# Patient Record
(demographics unavailable — no encounter records)

---

## 2024-10-09 NOTE — PHYSICAL EXAM
[Well Developed] : well developed [Well Nourished] : well nourished [Normal Conjunctiva] : normal conjunctiva [Normal Venous Pressure] : normal venous pressure [Normal S1, S2] : normal S1, S2 [No Rub] : no rub [Soft] : abdomen soft [Non Tender] : non-tender [Normal Gait] : normal gait [No Rash] : no rash [Moves all extremities] : moves all extremities [Alert and Oriented] : alert and oriented [No Murmur] : no murmur

## 2024-10-15 NOTE — HISTORY OF PRESENT ILLNESS
[FreeTextEntry1] : 53 y/o male with a PMHx of NICM s/p ICD 2019, LVEF 20% 2019, HTN, DM, PAF, current everyday cigarette and marijuana smoker. He is here for a follow-up. He recently  from his wife and is now living at a shelter, but has no other complaints. He denies SOB, LOC, chest pain, PND, orthopnea. He is compliant with medications and denies any side effects. Renal artery duplex (negative for TANIA on wet read) performed today. TTE done today reviewed, LVEF remains depressed at ~35%,  thickening of LV walls concerning for cardiac amyloidosis. Patient has non ischemic cardiomyopathy, LHC 2018 showed no CAD.

## 2024-10-15 NOTE — CARDIOLOGY SUMMARY
[de-identified] : TTE  5/10- LV systolic function improved, ~40% from ~20% in 2019. Diastolic function also, improved; MV inflow velocities suggest a less restrictive filling pattern, E/e' from 22 E/A 1.95 from 2.95, RV looks normal size now.

## 2024-10-15 NOTE — ASSESSMENT
[FreeTextEntry1] : 53 y/o M with h/o chronic systolic HF, NICM, HTN coming for follow up. RA duplex negative today for TANIA. TTE concerning for Cardiac amyloidosis. He is euvolemic on exam, no change in ET reported.   NICM/HFrEF/Resistant hypertension   NYHA class I/II  Euvolemic on exam POCUS IVC 1.7 cm and collapses >50% Continue Furosemide 40 mg daily, take extra dose as needed for weight gain of 2 lb in one day Continue Entresto 97/107 mg BID Continue Amlodipine 10 mg daily  Continue Carvedilol 25 mg BID Continue Farxiga 10 mg daily Continue Spironolactone 25 mg daily Blood work from April reviewed TTE and renal artery duplex performed today Will order NM cardiac amyloidosis SPECT to r/o cardiac ATTR amyloidosis, and follow up genetic test for amyloidosis Will send for serum light chains, immunofixation, pro-BNP, A1c and CMP RTO in 4 month or sooner based on above work up    The patient was counseled on lifestyle modifications to eat a heart-healthy diet, including sodium restriction and regular exercise. To keep a log of his blood pressure, heart rate, and daily weight. The weight is recommended to take first thing in the morning upon voiding. The blood pressure and heart rate can be taken anytime during the day.   Delon Santacruz MD, FACC, [facility]  Advanced Heart Failure/ Mechanical Circulatory Support Pulmonary Hypertension and Cardiac Amyloidosis  [facility]

## 2024-10-15 NOTE — ASSESSMENT
[FreeTextEntry1] : 53 y/o M with h/o chronic systolic HF, NICM, HTN coming for follow up. RA duplex negative today for TANIA. TTE concerning for Cardiac amyloidosis. He is euvolemic on exam, no change in ET reported.   NICM/HFrEF/Resistant hypertension   NYHA class I/II  Euvolemic on exam POCUS IVC 1.7 cm and collapses >50% Continue Furosemide 40 mg daily, take extra dose as needed for weight gain of 2 lb in one day Continue Entresto 97/107 mg BID Continue Amlodipine 10 mg daily  Continue Carvedilol 25 mg BID Continue Farxiga 10 mg daily Continue Spironolactone 25 mg daily Blood work from April reviewed TTE and renal artery duplex performed today Will order NM cardiac amyloidosis SPECT to r/o cardiac ATTR amyloidosis, and follow up genetic test for amyloidosis Will send for serum light chains, immunofixation, pro-BNP, A1c and CMP RTO in 4 month or sooner based on above work up    The patient was counseled on lifestyle modifications to eat a heart-healthy diet, including sodium restriction and regular exercise. To keep a log of his blood pressure, heart rate, and daily weight. The weight is recommended to take first thing in the morning upon voiding. The blood pressure and heart rate can be taken anytime during the day.   Delon Santacruz MD, FACC, [facility]  Advanced Heart Failure/ Mechanical Circulatory Support Pulmonary Hypertension and Cardiac Amyloidosis  [facility]

## 2024-10-15 NOTE — REVIEW OF SYSTEMS
[Fever] : no fever [Blurry Vision] : no blurred vision [Sore Throat] : no sore throat [SOB] : no shortness of breath [Dyspnea on exertion] : not dyspnea during exertion [Chest Discomfort] : no chest discomfort [Palpitations] : no palpitations [Cough] : no cough [Abdominal Pain] : no abdominal pain [Joint Pain] : no joint pain [Rash] : no rash [Dizziness] : no dizziness

## 2024-10-15 NOTE — CARDIOLOGY SUMMARY
[de-identified] : TTE  5/10- LV systolic function improved, ~40% from ~20% in 2019. Diastolic function also, improved; MV inflow velocities suggest a less restrictive filling pattern, E/e' from 22 E/A 1.95 from 2.95, RV looks normal size now.

## 2025-03-05 NOTE — DISCUSSION/SUMMARY
[FreeTextEntry1] : Mr. Vandana Tuttle is a pleasant 55 year-old man with severe non-ischemic cardiomyopathy, likely secondary to ETOH use, PAF on device interrogation (May 2020), chronic systolic heart failure (stage C NYHA II/III with EF < 20%, hyperlipidemia, hypertension, DM II, referred for evaluation for ICD. His EF remained low, despite compliance with GDMT (metoprolol and Lisinopril), and stopping ETOH. I am recommending a defibrillator implant for the primary prevention of sudden cardiac death. He underwent ICD implantation on 7/3/2019, h/o inappropriate shock for AF with RVR August 2023 due to medication non-compliance.  I recommend to continue same medications. He is motivated to maintain compliance with medications.  He is following with. Dr. Mays.  - Device interrogation normal. I interrogated and reprogrammed the device as described above.  - The patient is on remote and transmitting.   NSVT - Brief episodes.  - Continue Coreg 25mg BID.   # PAF - Asymptomatic. No inappropriate shocks for AF. Some AF with RVR recorded on device.  - Continue Eliquis 5mg BID. No s/s bleeding reported.  - I recommend CBC, BMP at least every 6 months. Follows with Dr. Mehta.  - Continue Coreg.   # HTN, HF - BP WNL. - Optivol now below threshold. No s/s HF on exam.  - Continue GDMT.   I interrogated and reprogrammed his device as described in procedure. His wound is healed properly, with no signs of inflammation, infection or bleeding. I discussed with patient plan of care in great details. I discussed remote monitoring with him, and need to call office if he does manual transmission. I answered all his questions to his satisfaction. Patient was pleased with the visit.    Patient will follow with me in 6 months' time. Please do not hesitate to contact me at [phone] if you have any further questions regarding this patient care.  Patient number: [phone]

## 2025-03-05 NOTE — PHYSICAL EXAM
[General Appearance - Well Developed] : well developed [Normal Appearance] : normal appearance [Well Groomed] : well groomed [General Appearance - Well Nourished] : well nourished [No Deformities] : no deformities [General Appearance - In No Acute Distress] : no acute distress [Heart Rate And Rhythm] : heart rate and rhythm were normal [Heart Sounds] : normal S1 and S2 [Respiration, Rhythm And Depth] : normal respiratory rhythm and effort [Exaggerated Use Of Accessory Muscles For Inspiration] : no accessory muscle use [Left Infraclavicular] : left infraclavicular area [Clean] : clean [Dry] : dry [Well-Healed] : well-healed [Abdomen Soft] : soft [Nail Clubbing] : no clubbing of the fingernails [Cyanosis, Localized] : no localized cyanosis [Normal Conjunctiva] : the conjunctiva exhibited no abnormalities [Eyelids - No Xanthelasma] : the eyelids demonstrated no xanthelasmas [Abnormal Walk] : normal gait [Gait - Sufficient For Exercise Testing] : the gait was sufficient for exercise testing [Skin Color & Pigmentation] : normal skin color and pigmentation [] : no rash [No Venous Stasis] : no venous stasis [Skin Lesions] : no skin lesions [No Skin Ulcers] : no skin ulcer [No Xanthoma] : no  xanthoma was observed [Oriented To Time, Place, And Person] : oriented to person, place, and time [FreeTextEntry1] : no JVD

## 2025-03-05 NOTE — HISTORY OF PRESENT ILLNESS
[Palpitations] : no palpitations [SOB] : no dyspnea [Syncope] : no syncope [Dizziness] : no dizziness [Chest Pain] : no chest pain or discomfort [ICD Shocks] : no recent ICD shocks [Shoulder Pain] : no shoulder pain [Pain at Site] : no pain at device site [Erythema at Site] : no erythema at device site [Swelling at Site] : no swelling at device site [de-identified] : Severe NICM, chronic systolic heart failure NYHA II/III EF low despite GDMT underwent ICD implant on 7/3/2019  h/o poor compliance with medical regimen; problems with [facility] per patient.  He presents for follow up. He feels well and has no cardiac complaints.  Got home from rehab and is trying to get everything back in order. He lost his MDT remote monitor when moving a couple of months ago and needs a replacement.   Referring Physician: Dr. Pretty Chávez   CARDIAC TESTING: ECG (1/24/2022): sinus rhythm at 94 bpm, q waves aVF, V1-V2, diffuse ST/T abnormalities ECG (11/24/2020): sinus rhythm at 93 bpm, q waves aVF, V1-V3, diffuse ST/T abnormalities ECG (3/3/2020): sinus rhythm at 84 bpm, q waves aVF, V1-V3, diffuse ST/T abnormalities ECG (8/14/2019): sinus rhythm at 99 bpm, q waves aVF, V1-V3, diffuse ST/T abnormalities ECG (6/24/2019): sinus rhythm at 88 bpm, q waves II aVF, V1-V4, diffuse ST/T abnormalities Echo (4/2/2019): LVEF < 20% Echo (9/23/2018): LVEF 25-30% Cardiac Cath (9/24/2018): normal coronaries

## 2025-03-05 NOTE — PROCEDURE
[No] : not [NSR] : normal sinus rhythm [ICD] : Implantable cardioverter-defibrillator [VVI] : VVI [Voltage: ___ volts] : Voltage was [unfilled] volts [Magnet Rate: ___ Ppm] : magnet rate was [unfilled] Ppm [Longevity: ___ months] : The estimated remaining battery life is [unfilled] months [Threshold Testing Performed] : Threshold testing was performed [Lead Imp:  ___ohms] : lead impedance was [unfilled] ohms [Sensing Amplitude ___mv] : sensing amplitude was [unfilled] mv [___V @] : [unfilled] V [___ ms] : [unfilled] ms [Programmed for Longevity] : output reprogrammed for improved battery longevity [Sense ___ %] : Sense [unfilled]% [de-identified] : Medtronic [de-identified] : Lorena ALDRIDGE  [de-identified] : [id number] [de-identified] : 7/3/19 [de-identified] : 40 [de-identified] : 3.9 years [de-identified] : 388 NSVT episodes, some are true brief NSVT episodes, most are AF with RVR No therapies or shocks Optivol accumulation 10/29/24-12/30/24, now below threshold.

## 2025-06-18 NOTE — CARDIOLOGY SUMMARY
[de-identified] : TTE  5/10- LV systolic function improved, ~40% from ~20% in 2019. Diastolic function also, improved; MV inflow velocities suggest a less restrictive filling pattern, E/e' from 22 E/A 1.95 from 2.95, RV looks normal size now.

## 2025-06-18 NOTE — PHYSICAL EXAM
[Well Developed] : well developed [Well Nourished] : well nourished [Normal Conjunctiva] : normal conjunctiva [Normal Venous Pressure] : normal venous pressure [Normal S1, S2] : normal S1, S2 [No Murmur] : no murmur [No Rub] : no rub [Soft] : abdomen soft [Non Tender] : non-tender [Normal Gait] : normal gait [No Rash] : no rash [Moves all extremities] : moves all extremities [Alert and Oriented] : alert and oriented

## 2025-06-18 NOTE — HISTORY OF PRESENT ILLNESS
[FreeTextEntry1] : 53 y/o male with a PMHx of NICM s/p ICD 2019, LVEF 20% 2019, HTN, DM, PAF, current everyday cigarette and marijuana smoker. He is here for a follow-up. He recently  from his wife and is now living at a shelter, but has no other complaints. He denies SOB, LOC, chest pain, PND, orthopnea. He is compliant with medications and denies any side effects. Renal artery duplex (negative for TANIA on wet read) performed today. TTE done today reviewed, LVEF remains depressed at ~35%,  thickening of LV walls concerning for cardiac amyloidosis. Patient has non ischemic cardiomyopathy, LHC 2018 showed no CAD.  06/18/2025: Patient presents for F/U visit on management of HFrEF. He reports feeling well and having no limitations with exertion. He stopped taking Eliquis, Farxiga, Spironolactone, Furosemide and Amlodipine 3 weeks ago because he ran out. Complains of rash in groin area and dark, foul-smelling urine. He doesn't check his BP at home, it is elevated in the office today, no TANIA on US, patient taking carvedilol 25 mg BID and Entresto  mg BID. In SR today, HR is well controlled. He is euvolemic on exam IVC 2.1 cm, collapsing >50%.   PYP scan was ordered due to abnormal findings on TTE, is negative for ATTR. HgA1C 9.2, kappa/lambda ratio is 2.01, serum IF with no monoclonal bands

## 2025-06-18 NOTE — ASSESSMENT
[FreeTextEntry1] : 55yoM with NICM (LVEF 20% in 2019 --> 35%, PYP and serum IF negative, likely hypertensive CM with possible component of alcoholic CM), prior alcohol abuse (quit 4 months ago), daily cigarette and MJ use (denies other illicits), and resistant HTN (RA duplex for TANAI negative) who presents for follow-up.   Patient has been off many medications for ~3 weeks. Despite this, his IVC is 2.18 cm and collapsing. NYHA class I. On ROS, patient states he has a history of snoring.   Plan:  - Continue carvedilol 25 mg BID and Entresto 97/103 mg BID - Check renin and sacha today - Resume spironolactone 25 mg daily  - Resume amlodipine 10 mg daily. Although patient has HFrEF, the priority is BP control and his lifestyle cannot tolerate TID medications.  - Sleep study for resistant HTN with h/o snoring - Will NOT restart Lasix or Farxiga - Check UA given complaints of foul smelling urine - If no UTI, can resume Farxiga 10 mg daily  - Patient instructed to follow-up with PMD for urinary complaints / scrotal itching - RTC in 3 weeks for BP check. If BP remains elevated, can consider adding chlorthalidone. Other options include changing amlodipine to nifedipine and increasing spironolactone to 50 mg daily  - Once BP better controlled, can follow-up q 4 months

## 2025-07-22 NOTE — CARDIOLOGY SUMMARY
[de-identified] : (7/21/2025) sinus rhythm at 62 bpm, non-sp T wave abnormalities. q III aVF (1/24/2022): sinus rhythm at 94 bpm, q waves aVF, V1-V2, diffuse ST/T abnormalities (11/24/2020): sinus rhythm at 93 bpm, q waves aVF, V1-V3, diffuse ST/T abnormalities (3/3/2020): sinus rhythm at 84 bpm, q waves aVF, V1-V3, diffuse ST/T abnormalities (8/14/2019): sinus rhythm at 99 bpm, q waves aVF, V1-V3, diffuse ST/T abnormalities (6/24/2019): sinus rhythm at 88 bpm, q waves II aVF, V1-V4, diffuse ST/T abnormalities [de-identified] : (7/3/2025) TTE:  1. Left ventricular cavity is mildly dilated. Left ventricular systolic function is severely decreased with an ejection fraction of 26 % by Stapleton's method of disks. Global left ventricular hypokinesis.  2. Multiple segmental abnormalities exist. See findings.  3. There is moderate (grade 2) left ventricular diastolic dysfunction.  4. Normal right ventricular cavity size and normal right ventricular systolic function.  5. Left atrium is moderately dilated.  6. Trace mitral regurgitation.  7. Structurally normal mitral valve with normal leaflet excursion.  8. No pericardial effusion seen.  9. Estimated pulmonary artery systolic pressure is 15 mmHg, consistent with normal pulmonary artery pressure. 10. Fibrocalcific aortic valve sclerosis without stenosis. 11. Mild to moderate left ventricular hypertrophy. 12. Mild tricuspid regurgitation. 13. The inferior vena cava is normal in size measuring 0.90 cm in diameter, (normal <2.1cm) with normal inspiratory collapse (normal >50%) consistent with normal right atrial pressure ( R 3, range 0-5mmHg).  Echo (4/2/2019): LVEF < 20% Echo (9/23/2018): LVEF 25-30%  [de-identified] : (7/3/2019) ICD implant, single, Medtronic [de-identified] : (7/3/2025) PCI to proximal Cx LM: Minor luminal irregularities  LAD: Mild disease D1: Mild disease  CX: Large, dominant vessel. 80% proximal stenosis (MLA 3.9mm2) s/p PCI OM1: Mild disease  LPDA: Mild disease RCA: Small, non-dominate vessel. Severe proximal disease LVEDP:  10mmHg  EF:  29% by ECHO  POST-OP DIAGNOSIS: 2-Vessel Coronary Artery Disease (LCX, RCA) 80% pLCX stenosis s/p PCI with excellent angiographic results. Severe proximal RCA disease (small, non-domnate vessel - medical therapy)  Cardiac Cath (9/24/2018): normal coronaries.

## 2025-07-22 NOTE — DISCUSSION/SUMMARY
[FreeTextEntry1] : Mr. Vandana Tuttle is a pleasant 55 year-old man with severe non-ischemic cardiomyopathy, likely secondary to ETOH use, PAF on device interrogation (May 2020), chronic systolic heart failure (stage C NYHA II/III with EF < 20%, hyperlipidemia, hypertension, DM II, ICD implantation on 7/3/2019, h/o inappropriate shock for AF with RVR August 2023 due to medication non-compliance, VF on 6/29/2025 terminated with ICD shock, CAD s/p PCI to left Cx on 7/3/2025..  I recommend to continue same medications. He is motivated to maintain compliance with medications.  He is following with. HF.  - Device interrogation normal. I interrogated and reprogrammed the device as described above.  - The patient is on remote and transmitting.   I recommend to continue Coreg, Entresto and Sotalol  I recommend continue Eliquis 5 mg q12h and Plavix 75 mg daily.  I interrogated and reprogrammed his device as described in procedure. His wound is healed properly, with no signs of inflammation, infection or bleeding. I discussed with patient plan of care in great details. I discussed remote monitoring with him, and need to call office if he does manual transmission. I answered all his questions to his satisfaction. Patient was pleased with the visit.    Patient will follow with me in 6 months time. Please do not hesitate to contact me at [phone] if you have any further questions regarding this patient care.

## 2025-07-22 NOTE — HISTORY OF PRESENT ILLNESS
[Palpitations] : no palpitations [SOB] : no dyspnea [Syncope] : no syncope [Dizziness] : no dizziness [Chest Pain] : no chest pain or discomfort [ICD Shocks] : no recent ICD shocks [Shoulder Pain] : no shoulder pain [Pain at Site] : no pain at device site [Erythema at Site] : no erythema at device site [Swelling at Site] : no swelling at device site [de-identified] : Patient number: [phone]  Severe NICM, chronic systolic heart failure NYHA II/III EF low despite GDMT underwent ICD implant on 7/3/2019 6/29/2025: VF terminated with ICD shock 7/3/2025: PCI to proximal Cx - started on sotalol 80 mg q12h 7/21/2025: in sinus - taking meds - on Eliquis + Plavix - On sotalol 80 q12h and Coreg 25 mg q12h. no chest pain. no syncope. no VT.

## 2025-07-22 NOTE — DISCUSSION/SUMMARY
[FreeTextEntry1] : Mr. Vandana Tuttle is a pleasant 55 year-old man with severe non-ischemic cardiomyopathy, likely secondary to ETOH use, PAF on device interrogation (May 2020), chronic systolic heart failure (stage C NYHA II/III with EF < 20%, hyperlipidemia, hypertension, DM II, ICD implantation on 7/3/2019, h/o inappropriate shock for AF with RVR August 2023 due to medication non-compliance, VF on 6/29/2025 terminated with ICD shock, CAD s/p PCI to left Cx on 7/3/2025..  I recommend to continue same medications. He is motivated to maintain compliance with medications.  He is following with. HF.  - Device interrogation normal. I interrogated and reprogrammed the device as described above.  - The patient is on remote and transmitting.   I recommend to continue Coreg, Entresto and Sotalol  I recommend continue Eliquis 5 mg q12h and Plavix 75 mg daily.  I interrogated and reprogrammed his device as described in procedure. His wound is healed properly, with no signs of inflammation, infection or bleeding. I discussed with patient plan of care in great details. I discussed remote monitoring with him, and need to call office if he does manual transmission. I answered all his questions to his satisfaction. Patient was pleased with the visit.    Patient will follow with me in 6 months time. Please do not hesitate to contact me at [phone] if you have any further questions regarding this patient care.

## 2025-07-22 NOTE — PROCEDURE
[No] : not [NSR] : normal sinus rhythm [ICD] : Implantable cardioverter-defibrillator [VVI] : VVI [Voltage: ___ volts] : Voltage was [unfilled] volts [Magnet Rate: ___ Ppm] : magnet rate was [unfilled] Ppm [Longevity: ___ months] : The estimated remaining battery life is [unfilled] months [Threshold Testing Performed] : Threshold testing was performed [Lead Imp:  ___ohms] : lead impedance was [unfilled] ohms [Sensing Amplitude ___mv] : sensing amplitude was [unfilled] mv [___V @] : [unfilled] V [___ ms] : [unfilled] ms [Programmed for Longevity] : output reprogrammed for improved battery longevity [Sense ___ %] : Sense [unfilled]% [de-identified] : Medtronic [de-identified] : Lorena ALDRIDGE  [de-identified] : [id number] [de-identified] : 7/3/19 [de-identified] : 40 [de-identified] : 3.1years [de-identified] : 2 short NSVT episodes lasting 1 second each Successful shock delivered on 6/29/25 for VF Optivol below threshold

## 2025-07-22 NOTE — CARDIOLOGY SUMMARY
[de-identified] : (7/21/2025) sinus rhythm at 62 bpm, non-sp T wave abnormalities. q III aVF (1/24/2022): sinus rhythm at 94 bpm, q waves aVF, V1-V2, diffuse ST/T abnormalities (11/24/2020): sinus rhythm at 93 bpm, q waves aVF, V1-V3, diffuse ST/T abnormalities (3/3/2020): sinus rhythm at 84 bpm, q waves aVF, V1-V3, diffuse ST/T abnormalities (8/14/2019): sinus rhythm at 99 bpm, q waves aVF, V1-V3, diffuse ST/T abnormalities (6/24/2019): sinus rhythm at 88 bpm, q waves II aVF, V1-V4, diffuse ST/T abnormalities [de-identified] : (7/3/2025) TTE:  1. Left ventricular cavity is mildly dilated. Left ventricular systolic function is severely decreased with an ejection fraction of 26 % by Stapleton's method of disks. Global left ventricular hypokinesis.  2. Multiple segmental abnormalities exist. See findings.  3. There is moderate (grade 2) left ventricular diastolic dysfunction.  4. Normal right ventricular cavity size and normal right ventricular systolic function.  5. Left atrium is moderately dilated.  6. Trace mitral regurgitation.  7. Structurally normal mitral valve with normal leaflet excursion.  8. No pericardial effusion seen.  9. Estimated pulmonary artery systolic pressure is 15 mmHg, consistent with normal pulmonary artery pressure. 10. Fibrocalcific aortic valve sclerosis without stenosis. 11. Mild to moderate left ventricular hypertrophy. 12. Mild tricuspid regurgitation. 13. The inferior vena cava is normal in size measuring 0.90 cm in diameter, (normal <2.1cm) with normal inspiratory collapse (normal >50%) consistent with normal right atrial pressure ( R 3, range 0-5mmHg).  Echo (4/2/2019): LVEF < 20% Echo (9/23/2018): LVEF 25-30%  [de-identified] : (7/3/2019) ICD implant, single, Medtronic [de-identified] : (7/3/2025) PCI to proximal Cx LM: Minor luminal irregularities  LAD: Mild disease D1: Mild disease  CX: Large, dominant vessel. 80% proximal stenosis (MLA 3.9mm2) s/p PCI OM1: Mild disease  LPDA: Mild disease RCA: Small, non-dominate vessel. Severe proximal disease LVEDP:  10mmHg  EF:  29% by ECHO  POST-OP DIAGNOSIS: 2-Vessel Coronary Artery Disease (LCX, RCA) 80% pLCX stenosis s/p PCI with excellent angiographic results. Severe proximal RCA disease (small, non-domnate vessel - medical therapy)  Cardiac Cath (9/24/2018): normal coronaries.

## 2025-07-22 NOTE — PROCEDURE
[No] : not [NSR] : normal sinus rhythm [ICD] : Implantable cardioverter-defibrillator [VVI] : VVI [Voltage: ___ volts] : Voltage was [unfilled] volts [Magnet Rate: ___ Ppm] : magnet rate was [unfilled] Ppm [Longevity: ___ months] : The estimated remaining battery life is [unfilled] months [Threshold Testing Performed] : Threshold testing was performed [Lead Imp:  ___ohms] : lead impedance was [unfilled] ohms [Sensing Amplitude ___mv] : sensing amplitude was [unfilled] mv [___V @] : [unfilled] V [___ ms] : [unfilled] ms [Programmed for Longevity] : output reprogrammed for improved battery longevity [Sense ___ %] : Sense [unfilled]% [de-identified] : Medtronic [de-identified] : Lorena ALDRIDGE  [de-identified] : [id number] [de-identified] : 7/3/19 [de-identified] : 40 [de-identified] : 3.1years [de-identified] : 2 short NSVT episodes lasting 1 second each Successful shock delivered on 6/29/25 for VF Optivol below threshold

## 2025-07-22 NOTE — HISTORY OF PRESENT ILLNESS
[Palpitations] : no palpitations [SOB] : no dyspnea [Syncope] : no syncope [Dizziness] : no dizziness [Chest Pain] : no chest pain or discomfort [ICD Shocks] : no recent ICD shocks [Shoulder Pain] : no shoulder pain [Pain at Site] : no pain at device site [Erythema at Site] : no erythema at device site [Swelling at Site] : no swelling at device site [de-identified] : Patient number: [phone]  Severe NICM, chronic systolic heart failure NYHA II/III EF low despite GDMT underwent ICD implant on 7/3/2019 6/29/2025: VF terminated with ICD shock 7/3/2025: PCI to proximal Cx - started on sotalol 80 mg q12h 7/21/2025: in sinus - taking meds - on Eliquis + Plavix - On sotalol 80 q12h and Coreg 25 mg q12h. no chest pain. no syncope. no VT.